# Patient Record
Sex: MALE | Race: WHITE | NOT HISPANIC OR LATINO | URBAN - METROPOLITAN AREA
[De-identification: names, ages, dates, MRNs, and addresses within clinical notes are randomized per-mention and may not be internally consistent; named-entity substitution may affect disease eponyms.]

---

## 2023-03-08 ENCOUNTER — NURSING HOME VISIT (OUTPATIENT)
Dept: POST ACUTE CARE | Facility: EXTERNAL LOCATION | Age: 78
End: 2023-03-08
Payer: COMMERCIAL

## 2023-03-08 DIAGNOSIS — G30.8 ALZHEIMER'S DEMENTIA OF OTHER ONSET, UNSPECIFIED DEMENTIA SEVERITY, UNSPECIFIED WHETHER BEHAVIORAL, PSYCHOTIC, OR MOOD DISTURBANCE OR ANXIETY (MULTI): ICD-10-CM

## 2023-03-08 DIAGNOSIS — F02.80 ALZHEIMER'S DEMENTIA OF OTHER ONSET, UNSPECIFIED DEMENTIA SEVERITY, UNSPECIFIED WHETHER BEHAVIORAL, PSYCHOTIC, OR MOOD DISTURBANCE OR ANXIETY (MULTI): ICD-10-CM

## 2023-03-08 DIAGNOSIS — I50.40 COMBINED SYSTOLIC AND DIASTOLIC CONGESTIVE HEART FAILURE, UNSPECIFIED HF CHRONICITY (MULTI): ICD-10-CM

## 2023-03-08 DIAGNOSIS — U07.1 COVID-19: Primary | ICD-10-CM

## 2023-03-08 DIAGNOSIS — I15.9 SECONDARY HYPERTENSION: ICD-10-CM

## 2023-03-08 DIAGNOSIS — J43.9 PULMONARY EMPHYSEMA, UNSPECIFIED EMPHYSEMA TYPE (MULTI): ICD-10-CM

## 2023-03-08 DIAGNOSIS — N40.1 BENIGN PROSTATIC HYPERPLASIA WITH LOWER URINARY TRACT SYMPTOMS, SYMPTOM DETAILS UNSPECIFIED: ICD-10-CM

## 2023-03-08 DIAGNOSIS — J18.9 PNEUMONIA DUE TO INFECTIOUS ORGANISM, UNSPECIFIED LATERALITY, UNSPECIFIED PART OF LUNG: ICD-10-CM

## 2023-03-08 DIAGNOSIS — K21.9 GASTROESOPHAGEAL REFLUX DISEASE WITHOUT ESOPHAGITIS: ICD-10-CM

## 2023-03-08 PROCEDURE — 99306 1ST NF CARE HIGH MDM 50: CPT | Performed by: INTERNAL MEDICINE

## 2023-03-08 NOTE — LETTER
Subjective  Chief complaint: Erwin Hernandez is a 77 y.o. male who is a acute skilled care patient being seen and evaluated for multiple medical problems.  Patient presents for weakness    HPI:  7 years old white male who presented to the hospitals with shortness of breath fever was found to have a COVID-19 pneumonia admitted to the hospitalist with oxygen he was treated for the pneumonia with remdesivir and dexamethasone patient was found to have higher troponin and higher D-dimer cardiology saw the patient echo was done and was normal.  Patient finished a course of remdesivir he was on the CPAP for the obstructive sleep apnea his oxygen is improved he was seen by the  by physical therapy suggested PT OT at the rehab       Review of Systems  All systems reviewed and negative except for what was mentioned in the HPI  Past medical history COPD  Obstructive sleep apnea  AAA status post AAA no rupture  Bipolar  Family history breast cancer lung cancer from breast cancer lung cancer hypertension.  Social history he is  former smoker former smoker  Allergies      Vital signs:     Objective  Physical Exam  Vitals reviewed.   Constitutional:       Appearance: Normal appearance.   HENT:      Head: Normocephalic and atraumatic.   Cardiovascular:      Rate and Rhythm: Normal rate and regular rhythm.   Pulmonary:      Effort: Pulmonary effort is normal.      Breath sounds: Normal breath sounds.   Abdominal:      General: Bowel sounds are normal.      Palpations: Abdomen is soft.   Musculoskeletal:      Cervical back: Neck supple.   Skin:     General: Skin is warm and dry.   Neurological:      General: No focal deficit present.      Mental Status: He is alert.   Psychiatric:         Mood and Affect: Mood normal.         Behavior: Behavior is cooperative.         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

## 2023-03-10 ENCOUNTER — NURSING HOME VISIT (OUTPATIENT)
Dept: POST ACUTE CARE | Facility: EXTERNAL LOCATION | Age: 78
End: 2023-03-10
Payer: COMMERCIAL

## 2023-03-10 DIAGNOSIS — R53.1 WEAKNESS: ICD-10-CM

## 2023-03-10 PROCEDURE — 99308 SBSQ NF CARE LOW MDM 20: CPT | Performed by: INTERNAL MEDICINE

## 2023-03-10 NOTE — LETTER
Patient: Erwin Hernandez  : 1945    Encounter Date: 03/10/2023    Subjective  Chief complaint: Erwin Hernandez is a 77 y.o. male who is a acute skilled care patient being seen and evaluated for weakness    HPI:  Patient working in therapy due to weakness since recent hospitalization. Requires minimal assistance for transfers and ADL's. NO new issues or concerns today.         Review of Systems  All systems reviewed and negative except for what was mentioned in the HPI    Vital signs:     Objective  Physical Exam  Constitutional:       General: He is not in acute distress.  Eyes:      Extraocular Movements: Extraocular movements intact.   Cardiovascular:      Rate and Rhythm: Regular rhythm.   Pulmonary:      Effort: Pulmonary effort is normal.      Breath sounds: Normal breath sounds.   Abdominal:      General: Bowel sounds are normal.      Palpations: Abdomen is soft.   Musculoskeletal:      Cervical back: Neck supple.      Right lower leg: No edema.      Left lower leg: No edema.   Neurological:      Mental Status: He is alert.   Psychiatric:         Mood and Affect: Mood normal.         Behavior: Behavior is cooperative.         Assessment/Plan  Problem List Items Addressed This Visit          Other    Weakness     therapy          Medications, treatments, and labs reviewed  Continue medications and treatments as listed in [facility]    Scribe Attestation  By signing my name below, IYuliya Scribe   attest that this documentation has been prepared under the direction and in the presence of Girma Nair MD.    Provider Attestation - Scribe documentation  All medical record entries made by the Scribe were at my direction and personally dictated by me. I have reviewed the chart and agree that the record accurately reflects my personal performance of the history, physical exam, discussion and plan.      Electronically Signed By: Girma Nair MD   3/15/23  7:50 PM

## 2023-03-11 PROBLEM — F02.80 ALZHEIMER'S DEMENTIA (MULTI): Status: ACTIVE | Noted: 2023-03-11

## 2023-03-11 PROBLEM — I50.40 COMBINED SYSTOLIC AND DIASTOLIC CONGESTIVE HEART FAILURE (MULTI): Status: ACTIVE | Noted: 2023-03-11

## 2023-03-11 PROBLEM — I15.9 SECONDARY HYPERTENSION: Status: ACTIVE | Noted: 2023-03-11

## 2023-03-11 PROBLEM — N40.1 BENIGN PROSTATIC HYPERPLASIA WITH LOWER URINARY TRACT SYMPTOMS: Status: ACTIVE | Noted: 2023-03-11

## 2023-03-11 PROBLEM — M17.0 OSTEOARTHRITIS OF BOTH KNEES: Status: ACTIVE | Noted: 2023-03-11

## 2023-03-11 PROBLEM — U07.1 COVID-19: Status: ACTIVE | Noted: 2023-03-11

## 2023-03-11 PROBLEM — G30.9 ALZHEIMER'S DEMENTIA (MULTI): Status: ACTIVE | Noted: 2023-03-11

## 2023-03-11 PROBLEM — J43.9 PULMONARY EMPHYSEMA (MULTI): Status: ACTIVE | Noted: 2023-03-11

## 2023-03-11 PROBLEM — J18.9 PNEUMONIA DUE TO INFECTIOUS ORGANISM: Status: ACTIVE | Noted: 2023-03-11

## 2023-03-11 PROBLEM — K21.9 GASTROESOPHAGEAL REFLUX DISEASE WITHOUT ESOPHAGITIS: Status: ACTIVE | Noted: 2023-03-11

## 2023-03-11 NOTE — PROGRESS NOTES
Subjective   Chief complaint: Erwin Hernandez is a 77 y.o. male who is a acute skilled care patient being seen and evaluated for multiple medical problems.  Patient presents for weakness    HPI:  7 years old white male who presented to the hospitals with shortness of breath fever was found to have a COVID-19 pneumonia admitted to the hospitalist with oxygen he was treated for the pneumonia with remdesivir and dexamethasone patient was found to have higher troponin and higher D-dimer cardiology saw the patient echo was done and was normal.  Patient finished a course of remdesivir he was on the CPAP for the obstructive sleep apnea his oxygen is improved he was seen by the  by physical therapy suggested PT OT at the rehab       Review of Systems  All systems reviewed and negative except for what was mentioned in the HPI  Past medical history COPD  Obstructive sleep apnea  AAA status post AAA no rupture  Bipolar  Family history breast cancer lung cancer from breast cancer lung cancer hypertension.  Social history he is  former smoker former smoker  Allergies      Vital signs:     Objective   Physical Exam  Vitals reviewed.   Constitutional:       Appearance: Normal appearance.   HENT:      Head: Normocephalic and atraumatic.   Cardiovascular:      Rate and Rhythm: Normal rate and regular rhythm.   Pulmonary:      Effort: Pulmonary effort is normal.      Breath sounds: Normal breath sounds.   Abdominal:      General: Bowel sounds are normal.      Palpations: Abdomen is soft.   Musculoskeletal:      Cervical back: Neck supple.   Skin:     General: Skin is warm and dry.   Neurological:      General: No focal deficit present.      Mental Status: He is alert.   Psychiatric:         Mood and Affect: Mood normal.         Behavior: Behavior is cooperative.         Assessment/Plan   Problem List Items Addressed This Visit          Nervous    Alzheimer's dementia (CMS/[facility])     Monitor for behavior problems             Respiratory    Pneumonia due to infectious organism     Remdesivir  Oxygen  Aerosol treatment         Pulmonary emphysema (CMS/HCC)     Aerosol treatment            Circulatory    Secondary hypertension     Continue home med         Combined systolic and diastolic congestive heart failure (CMS/HCC)     Lasix  Fluid restriction            Digestive    Gastroesophageal reflux disease without esophagitis     PPI            Genitourinary    Benign prostatic hyperplasia with lower urinary tract symptoms     Flomax finasteride            Infectious/Inflammatory    COVID-19 - Primary     Remdesivir          Medications, treatments, and labs reviewed  Continue medications and treatments as listed in [facility]

## 2023-03-13 DIAGNOSIS — K21.9 GASTROESOPHAGEAL REFLUX DISEASE WITHOUT ESOPHAGITIS: Primary | ICD-10-CM

## 2023-03-15 PROBLEM — R53.1 WEAKNESS: Status: ACTIVE | Noted: 2023-03-15

## 2023-03-15 RX ORDER — PANTOPRAZOLE SODIUM 40 MG/1
TABLET, DELAYED RELEASE ORAL
Qty: 90 TABLET | Refills: 3 | Status: SHIPPED | OUTPATIENT
Start: 2023-03-15

## 2023-03-15 RX ORDER — TAMSULOSIN HYDROCHLORIDE 0.4 MG/1
CAPSULE ORAL
Qty: 180 CAPSULE | Refills: 3 | Status: SHIPPED | OUTPATIENT
Start: 2023-03-15

## 2023-03-15 NOTE — PROGRESS NOTES
Subjective   Chief complaint: Erwin Hernandez is a 77 y.o. male who is a acute skilled care patient being seen and evaluated for weakness    HPI:  Patient working in therapy due to weakness since recent hospitalization. Requires minimal assistance for transfers and ADL's. NO new issues or concerns today.         Review of Systems  All systems reviewed and negative except for what was mentioned in the HPI    Vital signs:     Objective   Physical Exam  Constitutional:       General: He is not in acute distress.  Eyes:      Extraocular Movements: Extraocular movements intact.   Cardiovascular:      Rate and Rhythm: Regular rhythm.   Pulmonary:      Effort: Pulmonary effort is normal.      Breath sounds: Normal breath sounds.   Abdominal:      General: Bowel sounds are normal.      Palpations: Abdomen is soft.   Musculoskeletal:      Cervical back: Neck supple.      Right lower leg: No edema.      Left lower leg: No edema.   Neurological:      Mental Status: He is alert.   Psychiatric:         Mood and Affect: Mood normal.         Behavior: Behavior is cooperative.         Assessment/Plan   Problem List Items Addressed This Visit          Other    Weakness     therapy          Medications, treatments, and labs reviewed  Continue medications and treatments as listed in [facility]    Scribe Attestation  By signing my name below, I, Jayesh Villagran   attest that this documentation has been prepared under the direction and in the presence of Girma Nair MD.    Provider Attestation - Scribe documentation  All medical record entries made by the Scribe were at my direction and personally dictated by me. I have reviewed the chart and agree that the record accurately reflects my personal performance of the history, physical exam, discussion and plan.

## 2023-03-28 DIAGNOSIS — I15.9 SECONDARY HYPERTENSION: Primary | ICD-10-CM

## 2023-04-12 DIAGNOSIS — F02.80 ALZHEIMER'S DEMENTIA OF OTHER ONSET, UNSPECIFIED DEMENTIA SEVERITY, UNSPECIFIED WHETHER BEHAVIORAL, PSYCHOTIC, OR MOOD DISTURBANCE OR ANXIETY (MULTI): ICD-10-CM

## 2023-04-12 DIAGNOSIS — G30.8 ALZHEIMER'S DEMENTIA OF OTHER ONSET, UNSPECIFIED DEMENTIA SEVERITY, UNSPECIFIED WHETHER BEHAVIORAL, PSYCHOTIC, OR MOOD DISTURBANCE OR ANXIETY (MULTI): ICD-10-CM

## 2023-04-12 DIAGNOSIS — R53.1 WEAKNESS: Primary | ICD-10-CM

## 2023-06-14 RX ORDER — MEMANTINE HYDROCHLORIDE 10 MG/1
TABLET ORAL
Qty: 60 TABLET | Refills: 3 | Status: SHIPPED | OUTPATIENT
Start: 2023-06-14

## 2023-06-14 RX ORDER — LAMOTRIGINE 150 MG/1
TABLET ORAL
Qty: 60 TABLET | Refills: 3 | Status: SHIPPED | OUTPATIENT
Start: 2023-06-14

## 2023-06-14 RX ORDER — POTASSIUM CHLORIDE 1500 MG/1
TABLET, EXTENDED RELEASE ORAL
Qty: 15 TABLET | Refills: 3 | Status: SHIPPED | OUTPATIENT
Start: 2023-06-14

## 2023-06-15 RX ORDER — FUROSEMIDE 20 MG/1
TABLET ORAL
Qty: 15 TABLET | Refills: 0 | Status: SHIPPED | OUTPATIENT
Start: 2023-06-15 | End: 2023-08-03

## 2023-07-30 DIAGNOSIS — I15.9 SECONDARY HYPERTENSION: ICD-10-CM

## 2023-08-03 RX ORDER — FUROSEMIDE 20 MG/1
TABLET ORAL
Qty: 15 TABLET | Refills: 0 | Status: SHIPPED | OUTPATIENT
Start: 2023-08-03 | End: 2023-09-02

## 2024-11-20 NOTE — ASSESSMENT & PLAN NOTE
[email or web address]  URL: [email or web address]  